# Patient Record
Sex: MALE | Race: WHITE | NOT HISPANIC OR LATINO | URBAN - METROPOLITAN AREA
[De-identification: names, ages, dates, MRNs, and addresses within clinical notes are randomized per-mention and may not be internally consistent; named-entity substitution may affect disease eponyms.]

---

## 2024-10-15 NOTE — PROGRESS NOTES
History Of Present Illness  HPI   The patient is a 69-year-old male who complains of a left inguinal hernia which has been present for over 3 years.  He wears a hernia belt and as long as he wears the belt he has no symptoms.  Without the belt he does have left inguinal bulging and discomfort.  No prior left inguinal hernia repair.  The patient also noticed a small lump in the epigastric region yesterday which is asymptomatic.    Past medical history:  Right inguinal hernia repair in 2011.    Appendectomy for perforated appendix in 2013.    Tobacco: 1/2 pack/day.  I advised the patient to stop smoking.    Past Medical History  He has a past medical history of Personal history of other diseases of the digestive system and Residual hemorrhoidal skin tags (04/21/2020).    Surgical History  He has a past surgical history that includes Other surgical history (02/20/2020) and Other surgical history (07/30/2021).     Allergies  Patient has no known allergies.    Social History  He reports that he has been smoking cigarettes. He has never used smokeless tobacco. He reports that he does not drink alcohol and does not use drugs.    Family History  No family history on file.    Review of Systems  Review of Systems:  Constitutional:  no fever, no chills, no significant weight change  Neurological: No history of CVA or seizure disorder  Eyes: No pain, no recent visual change  ENT:  No recent hearing loss  Neck: No pain  Cardiovascular: No chest pain, no history of cardiac disease such as myocardial infarction or arrhythmia or congestive heart failure  Pulmonary: No shortness of breath, no history of pulmonary disease such as pneumonia or COPD  Breast: No history of breast disease or breast mass  Gastrointestinal:   no abdominal pain, no nausea or vomiting, no constipation or diarrhea or blood in the stool.  No history of ulcers.  No liver, gallbladder or pancreas disease.  No intestinal disorder.  Genitourinary: No hematuria or  "dysuria, no kidney disease  Musculoskeletal:  no arthralgia, no muscle or bone pain  Integumentary:  no rash  Psychiatric:  No anxiety or depression  Endocrine:  no history of diabetes  Hematologic/Lymphatic: No easy bruising or bleeding    Last Recorded Vitals  Blood pressure 121/73, pulse 75, temperature 36.3 °C (97.4 °F), temperature source Temporal, resp. rate 16, height 1.753 m (5' 9\"), weight 67.1 kg (148 lb), SpO2 94%.    Physical Exam  Constitutional: Well-developed, well-nourished, alert and oriented, no acute distress  Skin: Warm and dry, no lesions, no rashes, no jaundice  HEENT: Normocephalic, atraumatic, EOMI, no scleral icterus, eyes have no redness or swelling or discharge, external inspection of ears and nose is normal, mucous membranes moist  Neck: Soft, nontender, no mass or adenopathy  Cardiac: Regular rate and rhythm, no murmur  Chest: Patent airway, clear to auscultation, normal breath sounds with good chest expansion, no wheezes or rales or rhonchi noted, thorax symmetric  Abdomen: Nondistended, positive bowel sounds, soft, nontender, no mass.  Minimal epigastric bulge when straining.  No palpable mass.  Uncertain whether this could be a small epigastric hernia.  Left inguinal hernia, reducible.  No right inguinal or femoral hernias.  Rectal: Not performed  Extremities: No injury, no lower extremity edema or calf tenderness  Lymphatic: No cervical adenopathy  Musculoskeletal: Range of motion intact, no joint swelling, normal strength  Neurological: Alert and oriented x3, intact sensory and motor function, no obvious focal neurologic abnormalities, normal gait  Psychological: Appropriate mood and behavior  Patient declined a chaperone    Relevant Results       Assessment/Plan   Diagnoses and all orders for this visit:  Left inguinal hernia    69-year-old male with a left inguinal hernia which is reducible, but symptomatic.  Recommend left inguinal herniorrhaphy with mesh either open or robotic " assisted laparoscopic.  I discussed the procedure and risks with the patient. The risks include bleeding, infection, mesh infection, recurrence, injury to surrounding structures such as nerves/blood vessels/intestine/bladder, chronic postop pain, cardiac/pulmonary complications.   If the mesh becomes infected it could require excision.  I discussed the alternative of hernia repair without mesh and observation.  The patient had prior perforated appendix, increasing his chance of requiring conversion to an open operation with laparoscopy.  Therefore, the patient agrees to have an open repair of the left inguinal hernia with mesh.  Electronic consent obtained.  I discussed the option of ordering CT abdomen for further evaluation of the small epigastric bulge to rule out a hernia.  The area is asymptomatic and the patient does not wish to have imaging at this time.      Zach Contreras MD

## 2024-10-16 ENCOUNTER — PREP FOR PROCEDURE (OUTPATIENT)
Dept: SURGERY | Facility: CLINIC | Age: 69
End: 2024-10-16

## 2024-10-16 ENCOUNTER — APPOINTMENT (OUTPATIENT)
Dept: SURGERY | Facility: CLINIC | Age: 69
End: 2024-10-16
Payer: COMMERCIAL

## 2024-10-16 VITALS
RESPIRATION RATE: 16 BRPM | BODY MASS INDEX: 21.92 KG/M2 | HEART RATE: 75 BPM | HEIGHT: 69 IN | SYSTOLIC BLOOD PRESSURE: 121 MMHG | DIASTOLIC BLOOD PRESSURE: 73 MMHG | WEIGHT: 148 LBS | TEMPERATURE: 97.4 F | OXYGEN SATURATION: 94 %

## 2024-10-16 DIAGNOSIS — K40.90 LEFT INGUINAL HERNIA: Primary | ICD-10-CM

## 2024-10-16 PROCEDURE — 3008F BODY MASS INDEX DOCD: CPT | Performed by: SURGERY

## 2024-10-16 PROCEDURE — 1126F AMNT PAIN NOTED NONE PRSNT: CPT | Performed by: SURGERY

## 2024-10-16 PROCEDURE — 99203 OFFICE O/P NEW LOW 30 MIN: CPT | Performed by: SURGERY

## 2024-10-16 RX ORDER — CEFAZOLIN SODIUM 2 G/100ML
2 INJECTION, SOLUTION INTRAVENOUS ONCE
OUTPATIENT
Start: 2024-10-16 | End: 2024-10-16

## 2024-10-16 ASSESSMENT — ENCOUNTER SYMPTOMS
LOSS OF SENSATION IN FEET: 0
OCCASIONAL FEELINGS OF UNSTEADINESS: 0
DEPRESSION: 0

## 2024-10-16 ASSESSMENT — PAIN SCALES - GENERAL: PAINLEVEL_OUTOF10: 0-NO PAIN

## 2024-10-16 NOTE — LETTER
October 16, 2024     Loree Lilly MD  [address]  [facility] [address]    Patient: Flavio Espinoza   YOB: 1955   Date of Visit: 10/16/2024       Dear Dr. Loree Lilly MD:    Thank you for referring Flavio Espinoza to me for evaluation. Below are my notes for this consultation.  If you have questions, please do not hesitate to call me. I look forward to following your patient along with you.       Sincerely,     Zach Contreras MD      CC: No Recipients  ______________________________________________________________________________________    History Of Present Illness  HPI   The patient is a 69-year-old male who complains of a left inguinal hernia which has been present for over 3 years.  He wears a hernia belt and as long as he wears the belt he has no symptoms.  Without the belt he does have left inguinal bulging and discomfort.  No prior left inguinal hernia repair.  The patient also noticed a small lump in the epigastric region yesterday which is asymptomatic.    Past medical history:  Right inguinal hernia repair in 2011.    Appendectomy for perforated appendix in 2013.    Tobacco: 1/2 pack/day.  I advised the patient to stop smoking.    Past Medical History  He has a past medical history of Personal history of other diseases of the digestive system and Residual hemorrhoidal skin tags (04/21/2020).    Surgical History  He has a past surgical history that includes Other surgical history (02/20/2020) and Other surgical history (07/30/2021).     Allergies  Patient has no known allergies.    Social History  He reports that he has been smoking cigarettes. He has never used smokeless tobacco. He reports that he does not drink alcohol and does not use drugs.    Family History  No family history on file.    Review of Systems  Review of Systems:  Constitutional:  no fever, no chills, no significant weight change  Neurological: No history of CVA or seizure disorder  Eyes: No pain, no recent visual  "change  ENT:  No recent hearing loss  Neck: No pain  Cardiovascular: No chest pain, no history of cardiac disease such as myocardial infarction or arrhythmia or congestive heart failure  Pulmonary: No shortness of breath, no history of pulmonary disease such as pneumonia or COPD  Breast: No history of breast disease or breast mass  Gastrointestinal:   no abdominal pain, no nausea or vomiting, no constipation or diarrhea or blood in the stool.  No history of ulcers.  No liver, gallbladder or pancreas disease.  No intestinal disorder.  Genitourinary: No hematuria or dysuria, no kidney disease  Musculoskeletal:  no arthralgia, no muscle or bone pain  Integumentary:  no rash  Psychiatric:  No anxiety or depression  Endocrine:  no history of diabetes  Hematologic/Lymphatic: No easy bruising or bleeding    Last Recorded Vitals  Blood pressure 121/73, pulse 75, temperature 36.3 °C (97.4 °F), temperature source Temporal, resp. rate 16, height 1.753 m (5' 9\"), weight 67.1 kg (148 lb), SpO2 94%.    Physical Exam  Constitutional: Well-developed, well-nourished, alert and oriented, no acute distress  Skin: Warm and dry, no lesions, no rashes, no jaundice  HEENT: Normocephalic, atraumatic, EOMI, no scleral icterus, eyes have no redness or swelling or discharge, external inspection of ears and nose is normal, mucous membranes moist  Neck: Soft, nontender, no mass or adenopathy  Cardiac: Regular rate and rhythm, no murmur  Chest: Patent airway, clear to auscultation, normal breath sounds with good chest expansion, no wheezes or rales or rhonchi noted, thorax symmetric  Abdomen: Nondistended, positive bowel sounds, soft, nontender, no mass.  Minimal epigastric bulge when straining.  No palpable mass.  Uncertain whether this could be a small epigastric hernia.  Left inguinal hernia, reducible.  No right inguinal or femoral hernias.  Rectal: Not performed  Extremities: No injury, no lower extremity edema or calf " tenderness  Lymphatic: No cervical adenopathy  Musculoskeletal: Range of motion intact, no joint swelling, normal strength  Neurological: Alert and oriented x3, intact sensory and motor function, no obvious focal neurologic abnormalities, normal gait  Psychological: Appropriate mood and behavior  Patient declined a chaperone    Relevant Results       Assessment/Plan   Diagnoses and all orders for this visit:  Left inguinal hernia    69-year-old male with a left inguinal hernia which is reducible, but symptomatic.  Recommend left inguinal herniorrhaphy with mesh either open or robotic assisted laparoscopic.  I discussed the procedure and risks with the patient. The risks include bleeding, infection, mesh infection, recurrence, injury to surrounding structures such as nerves/blood vessels/intestine/bladder, chronic postop pain, cardiac/pulmonary complications.   If the mesh becomes infected it could require excision.  I discussed the alternative of hernia repair without mesh and observation.  The patient had prior perforated appendix, increasing his chance of requiring conversion to an open operation with laparoscopy.  Therefore, the patient agrees to have an open repair of the left inguinal hernia with mesh.  Electronic consent obtained.  I discussed the option of ordering CT abdomen for further evaluation of the small epigastric bulge to rule out a hernia.  The area is asymptomatic and the patient does not wish to have imaging at this time.      Zach Contreras MD

## 2024-10-17 ENCOUNTER — TELEPHONE (OUTPATIENT)
Dept: SURGERY | Facility: CLINIC | Age: 69
End: 2024-10-17
Payer: COMMERCIAL

## 2024-10-17 NOTE — TELEPHONE ENCOUNTER
Left a detailed message wanting to schedule patient's surgical procedure. Gave my direct phone number for return call.  Upon return call will schedule accordingly.

## 2024-10-30 DIAGNOSIS — Z01.818 PRE-OP TESTING: Primary | ICD-10-CM

## 2024-11-04 ENCOUNTER — LAB (OUTPATIENT)
Dept: LAB | Facility: LAB | Age: 69
End: 2024-11-04
Payer: COMMERCIAL

## 2024-11-04 DIAGNOSIS — Z01.818 PRE-OP TESTING: ICD-10-CM

## 2024-11-04 LAB
ANION GAP SERPL CALC-SCNC: 10 MMOL/L (ref 10–20)
BUN SERPL-MCNC: 13 MG/DL (ref 6–23)
CALCIUM SERPL-MCNC: 9 MG/DL (ref 8.6–10.3)
CHLORIDE SERPL-SCNC: 108 MMOL/L (ref 98–107)
CO2 SERPL-SCNC: 27 MMOL/L (ref 21–32)
CREAT SERPL-MCNC: 1.02 MG/DL (ref 0.5–1.3)
EGFRCR SERPLBLD CKD-EPI 2021: 79 ML/MIN/1.73M*2
ERYTHROCYTE [DISTWIDTH] IN BLOOD BY AUTOMATED COUNT: 14 % (ref 11.5–14.5)
GLUCOSE SERPL-MCNC: 88 MG/DL (ref 74–99)
HCT VFR BLD AUTO: 41.5 % (ref 41–52)
HGB BLD-MCNC: 13.4 G/DL (ref 13.5–17.5)
MCH RBC QN AUTO: 30.1 PG (ref 26–34)
MCHC RBC AUTO-ENTMCNC: 32.3 G/DL (ref 32–36)
MCV RBC AUTO: 93 FL (ref 80–100)
NRBC BLD-RTO: 0 /100 WBCS (ref 0–0)
PLATELET # BLD AUTO: 241 X10*3/UL (ref 150–450)
POTASSIUM SERPL-SCNC: 4.2 MMOL/L (ref 3.5–5.3)
RBC # BLD AUTO: 4.45 X10*6/UL (ref 4.5–5.9)
SODIUM SERPL-SCNC: 141 MMOL/L (ref 136–145)
WBC # BLD AUTO: 6.4 X10*3/UL (ref 4.4–11.3)

## 2024-11-04 PROCEDURE — 85027 COMPLETE CBC AUTOMATED: CPT

## 2024-11-04 PROCEDURE — 80048 BASIC METABOLIC PNL TOTAL CA: CPT

## 2024-11-06 NOTE — PREPROCEDURE INSTRUCTIONS
No outpatient medications have been marked as taking for the 11/7/24 encounter (Hospital Encounter).            NPO Instructions:    Do not eat any food after midnight the night before your surgery/procedure.  You may have 13 ounces of clear liquids until TWO hours before surgery/procedure. This includes water, black tea/coffee, (no milk or cream) apple juice and electrolyte drinks (Gatorade).    Additional Instructions:     Day of Surgery: Arrive at 1:30 PM for 3:10 PM surgery    Enter through main entrance of [facility], located at [address]. Proceed to registration, located in the back right hand corner. You will need your ID and insurance card for registration. Please ensure you have a responsible adult to drive you home.     Take a shower the morning of or night before your procedure. After you shower avoid lotions, powders, deodorants or anything applied to the skin. If you wear contacts or glasses, wear the glasses. If you do not have glasses, please bring a case for your contacts. You may wear hearing aids and dentures, bring a case for them or we will provide one. Make sure you wear something loose and comfortable. Keep in mind your surgery type and wear something that will accommodate incisions or bandages. Please remove all jewelry.     For further questions Russell ABAD can be contacted at [phone] between 7AM-3PM.

## 2024-11-07 ENCOUNTER — ANESTHESIA EVENT (OUTPATIENT)
Dept: OPERATING ROOM | Facility: HOSPITAL | Age: 69
End: 2024-11-07
Payer: COMMERCIAL

## 2024-11-07 ENCOUNTER — HOSPITAL ENCOUNTER (OUTPATIENT)
Facility: HOSPITAL | Age: 69
Setting detail: OUTPATIENT SURGERY
Discharge: HOME | End: 2024-11-07
Attending: SURGERY | Admitting: SURGERY
Payer: COMMERCIAL

## 2024-11-07 ENCOUNTER — ANESTHESIA (OUTPATIENT)
Dept: OPERATING ROOM | Facility: HOSPITAL | Age: 69
End: 2024-11-07
Payer: COMMERCIAL

## 2024-11-07 VITALS
BODY MASS INDEX: 21.91 KG/M2 | HEART RATE: 50 BPM | TEMPERATURE: 96.8 F | RESPIRATION RATE: 16 BRPM | HEIGHT: 69 IN | DIASTOLIC BLOOD PRESSURE: 88 MMHG | WEIGHT: 147.93 LBS | SYSTOLIC BLOOD PRESSURE: 141 MMHG | OXYGEN SATURATION: 99 %

## 2024-11-07 DIAGNOSIS — K40.90 LEFT INGUINAL HERNIA: Primary | ICD-10-CM

## 2024-11-07 PROCEDURE — C1781 MESH (IMPLANTABLE): HCPCS | Performed by: SURGERY

## 2024-11-07 PROCEDURE — 3600000008 HC OR TIME - EACH INCREMENTAL 1 MINUTE - PROCEDURE LEVEL THREE: Performed by: SURGERY

## 2024-11-07 PROCEDURE — 3700000002 HC GENERAL ANESTHESIA TIME - EACH INCREMENTAL 1 MINUTE: Performed by: SURGERY

## 2024-11-07 PROCEDURE — 3600000003 HC OR TIME - INITIAL BASE CHARGE - PROCEDURE LEVEL THREE: Performed by: SURGERY

## 2024-11-07 PROCEDURE — 7100000010 HC PHASE TWO TIME - EACH INCREMENTAL 1 MINUTE: Performed by: SURGERY

## 2024-11-07 PROCEDURE — 2500000004 HC RX 250 GENERAL PHARMACY W/ HCPCS (ALT 636 FOR OP/ED): Performed by: NURSE ANESTHETIST, CERTIFIED REGISTERED

## 2024-11-07 PROCEDURE — 3700000001 HC GENERAL ANESTHESIA TIME - INITIAL BASE CHARGE: Performed by: SURGERY

## 2024-11-07 PROCEDURE — 2780000003 HC OR 278 NO HCPCS: Performed by: SURGERY

## 2024-11-07 PROCEDURE — 7100000009 HC PHASE TWO TIME - INITIAL BASE CHARGE: Performed by: SURGERY

## 2024-11-07 PROCEDURE — 2500000004 HC RX 250 GENERAL PHARMACY W/ HCPCS (ALT 636 FOR OP/ED): Mod: JZ | Performed by: SURGERY

## 2024-11-07 PROCEDURE — 2720000007 HC OR 272 NO HCPCS: Performed by: SURGERY

## 2024-11-07 PROCEDURE — 2500000005 HC RX 250 GENERAL PHARMACY W/O HCPCS: Performed by: NURSE ANESTHETIST, CERTIFIED REGISTERED

## 2024-11-07 PROCEDURE — 93005 ELECTROCARDIOGRAM TRACING: CPT

## 2024-11-07 PROCEDURE — 49505 PRP I/HERN INIT REDUC >5 YR: CPT | Performed by: SURGERY

## 2024-11-07 PROCEDURE — 2500000004 HC RX 250 GENERAL PHARMACY W/ HCPCS (ALT 636 FOR OP/ED): Performed by: SURGERY

## 2024-11-07 DEVICE — BARD SOFT MESH, 4" X 6" (10 CM X 15 CM)
Type: IMPLANTABLE DEVICE | Site: INGUINAL | Status: FUNCTIONAL
Brand: BARD

## 2024-11-07 RX ORDER — PROPOFOL 10 MG/ML
INJECTION, EMULSION INTRAVENOUS AS NEEDED
Status: DISCONTINUED | OUTPATIENT
Start: 2024-11-07 | End: 2024-11-07

## 2024-11-07 RX ORDER — ALBUTEROL SULFATE 0.83 MG/ML
2.5 SOLUTION RESPIRATORY (INHALATION) ONCE AS NEEDED
Status: DISCONTINUED | OUTPATIENT
Start: 2024-11-07 | End: 2024-11-07 | Stop reason: HOSPADM

## 2024-11-07 RX ORDER — PHENYLEPHRINE HCL IN 0.9% NACL 1 MG/10 ML
SYRINGE (ML) INTRAVENOUS AS NEEDED
Status: DISCONTINUED | OUTPATIENT
Start: 2024-11-07 | End: 2024-11-07

## 2024-11-07 RX ORDER — DEXAMETHASONE SODIUM PHOSPHATE 10 MG/ML
6 INJECTION INTRAMUSCULAR; INTRAVENOUS ONCE
Status: DISCONTINUED | OUTPATIENT
Start: 2024-11-07 | End: 2024-11-07 | Stop reason: HOSPADM

## 2024-11-07 RX ORDER — FENTANYL CITRATE 50 UG/ML
INJECTION, SOLUTION INTRAMUSCULAR; INTRAVENOUS AS NEEDED
Status: DISCONTINUED | OUTPATIENT
Start: 2024-11-07 | End: 2024-11-07

## 2024-11-07 RX ORDER — CEFAZOLIN SODIUM 2 G/100ML
2 INJECTION, SOLUTION INTRAVENOUS ONCE
Status: COMPLETED | OUTPATIENT
Start: 2024-11-07 | End: 2024-11-07

## 2024-11-07 RX ORDER — LIDOCAINE HYDROCHLORIDE 10 MG/ML
INJECTION, SOLUTION INFILTRATION; PERINEURAL AS NEEDED
Status: DISCONTINUED | OUTPATIENT
Start: 2024-11-07 | End: 2024-11-07 | Stop reason: HOSPADM

## 2024-11-07 RX ORDER — MIDAZOLAM HYDROCHLORIDE 1 MG/ML
INJECTION, SOLUTION INTRAMUSCULAR; INTRAVENOUS AS NEEDED
Status: DISCONTINUED | OUTPATIENT
Start: 2024-11-07 | End: 2024-11-07

## 2024-11-07 RX ORDER — BUPIVACAINE HYDROCHLORIDE 5 MG/ML
INJECTION, SOLUTION EPIDURAL; INTRACAUDAL AS NEEDED
Status: DISCONTINUED | OUTPATIENT
Start: 2024-11-07 | End: 2024-11-07 | Stop reason: HOSPADM

## 2024-11-07 RX ORDER — GLYCOPYRROLATE 0.2 MG/ML
INJECTION INTRAMUSCULAR; INTRAVENOUS AS NEEDED
Status: DISCONTINUED | OUTPATIENT
Start: 2024-11-07 | End: 2024-11-07

## 2024-11-07 RX ORDER — ACETAMINOPHEN 325 MG/1
650 TABLET ORAL EVERY 4 HOURS PRN
Status: DISCONTINUED | OUTPATIENT
Start: 2024-11-07 | End: 2024-11-07 | Stop reason: HOSPADM

## 2024-11-07 RX ORDER — MEPERIDINE HYDROCHLORIDE 25 MG/ML
12.5 INJECTION INTRAMUSCULAR; INTRAVENOUS; SUBCUTANEOUS EVERY 10 MIN PRN
Status: DISCONTINUED | OUTPATIENT
Start: 2024-11-07 | End: 2024-11-07 | Stop reason: HOSPADM

## 2024-11-07 RX ORDER — LIDOCAINE HYDROCHLORIDE 10 MG/ML
INJECTION, SOLUTION INFILTRATION; PERINEURAL AS NEEDED
Status: DISCONTINUED | OUTPATIENT
Start: 2024-11-07 | End: 2024-11-07

## 2024-11-07 RX ORDER — ONDANSETRON HYDROCHLORIDE 2 MG/ML
INJECTION, SOLUTION INTRAVENOUS AS NEEDED
Status: DISCONTINUED | OUTPATIENT
Start: 2024-11-07 | End: 2024-11-07

## 2024-11-07 RX ORDER — OXYCODONE HYDROCHLORIDE 5 MG/1
5 TABLET ORAL EVERY 6 HOURS PRN
Qty: 8 TABLET | Refills: 0 | Status: SHIPPED | OUTPATIENT
Start: 2024-11-07

## 2024-11-07 RX ORDER — GABAPENTIN 300 MG/1
300 CAPSULE ORAL 3 TIMES DAILY PRN
Qty: 15 CAPSULE | Refills: 0 | Status: SHIPPED | OUTPATIENT
Start: 2024-11-07

## 2024-11-07 SDOH — HEALTH STABILITY: MENTAL HEALTH: CURRENT SMOKER: 1

## 2024-11-07 ASSESSMENT — PAIN SCALES - GENERAL
PAINLEVEL_OUTOF10: 0 - NO PAIN
PAIN_LEVEL: 0
PAINLEVEL_OUTOF10: 0 - NO PAIN

## 2024-11-07 ASSESSMENT — PAIN - FUNCTIONAL ASSESSMENT
PAIN_FUNCTIONAL_ASSESSMENT: 0-10
PAIN_FUNCTIONAL_ASSESSMENT: VAS (VISUAL ANALOG SCALE)

## 2024-11-07 ASSESSMENT — COLUMBIA-SUICIDE SEVERITY RATING SCALE - C-SSRS
1. IN THE PAST MONTH, HAVE YOU WISHED YOU WERE DEAD OR WISHED YOU COULD GO TO SLEEP AND NOT WAKE UP?: NO
2. HAVE YOU ACTUALLY HAD ANY THOUGHTS OF KILLING YOURSELF?: NO
6. HAVE YOU EVER DONE ANYTHING, STARTED TO DO ANYTHING, OR PREPARED TO DO ANYTHING TO END YOUR LIFE?: NO

## 2024-11-07 NOTE — ANESTHESIA PREPROCEDURE EVALUATION
Patient: Flavio Espinoza    Procedure Information       Date/Time: 11/07/24 1510    Procedure: LEFT INGUINAL HERNIA OPEN REPAIR WITH MESH (Left)    Location: [facility] 05 / [facility]    Surgeons: Zach Contreras MD            Relevant Problems   No relevant active problems       Clinical information reviewed:   Tobacco  Allergies  Meds  Problems  Med Hx  Surg Hx   Fam Hx  Soc   Hx        NPO Detail:  NPO/Void Status  Carbohydrate Drink Given Prior to Surgery? : N  Date of Last Liquid: 11/07/24  Time of Last Liquid: 1030  Date of Last Solid: 11/06/24  Time of Last Solid: 2130  Last Intake Type: Solid meal  Time of Last Void: 1430         Physical Exam    Airway  Mallampati: II  TM distance: >3 FB  Neck ROM: full     Cardiovascular - normal exam     Dental - normal exam     Pulmonary - normal exam     Abdominal - normal exam             Anesthesia Plan    History of general anesthesia?: yes  History of complications of general anesthesia?: no    ASA 2     MAC     The patient is a current smoker.  Patient was previously instructed to abstain from smoking on day of procedure.  Patient did not smoke on day of procedure.    intravenous induction   Anesthetic plan and risks discussed with patient.    Plan discussed with CRNA.

## 2024-11-07 NOTE — OP NOTE
LEFT INGUINAL HERNIA OPEN REPAIR WITH MESH (L) Operative Note     Date: 2024  OR Location: [facility]    Name: Flavio Espinoza, : 1955, Age: 69 y.o., MRN: [id number], Sex: male    Diagnosis  Pre-op Diagnosis      * Left inguinal hernia [K40.90] Post-op Diagnosis     * Left inguinal hernia [K40.90]     Procedures  LEFT INGUINAL HERNIA OPEN REPAIR WITH MESH  39[address] - CO RPR 1ST INGUN HRNA AGE 5 YRS/> REDUCIBLE      Surgeons      * Zach Contreras - Primary    Resident/Fellow/Other Assistant:  Surgeons and Role:  * No surgeons found with a matching role *    Staff:   Circulator: Meron De Leonub Person: Liam  Surgical Assistant: Lisa  Surgical Assistant: Isabelle Galo Circulator: Iman  Circulator: Sarah    Anesthesia Staff: Anesthesiologist: Vignesh Grewal MD; Silva Diaz MD  CRNA: KEKE Carroll-MALINDA    Procedure Summary  Anesthesia: Monitor Anesthesia Care  ASA: II  Estimated Blood Loss: 0mL  Intra-op Medications:   Administrations occurring from 1510 to 1710 on 24:   Medication Name Total Dose   lidocaine (Xylocaine) 10 mg/mL (1 %) injection 10 mL   bupivacaine PF 0.5 % (Marcaine) 0.5 % (5 mg/mL) injection 10 mL   dexAMETHasone (Decadron) injection 4 mg/mL 6 mg   fentaNYL (Sublimaze) injection 50 mcg/mL 50 mcg   glycopyrrolate (Robinul) injection 0.2 mg/mL 0.4 mg   ketamine 10 mg/mL IV syringe 30 mg   LR bolus Cannot be calculated   lidocaine (Xylocaine) injection 1 % 50 mg   midazolam (Versed) injection 1 mg/mL 2 mg   ondansetron (Zofran) 2 mg/mL injection 4 mg   phenylephrine 100 mcg/mL syringe 10 mL (prefilled) 250 mcg   propofol (Diprivan) injection 10 mg/mL 628.08 mg   ceFAZolin (Ancef) 2 g in dextrose (iso)  mL 2 g              Anesthesia Record               Intraprocedure I/O Totals          Intake    ceFAZolin (Ancef) 2 g in dextrose (iso)  mL 100.00 mL    Total Intake 100 mL          Specimen: No specimens collected              Drains and/or Catheters: *  None in log *    Tourniquet Times:         Implants:  Implants       Type Name Action Serial No.      Surgical Mesh Sling Implant MESH, SURGICAL, SOFT, POLYPROPYLENE 4 X 6 - [id number] Implanted               Findings: Left direct inguinal hernia    Indications: Flavio Espinoza is an 69 y.o. male who is having surgery for Left inguinal hernia [K40.90].     The patient was seen in the preoperative area. The risks, benefits, complications, treatment options, non-operative alternatives, expected recovery and outcomes were discussed with the patient. The possibilities of reaction to medication, pulmonary aspiration, injury to surrounding structures, bleeding, recurrent infection, the need for additional procedures, failure to diagnose a condition, and creating a complication requiring transfusion or operation were discussed with the patient. The patient concurred with the proposed plan, giving informed consent.  The site of surgery was properly noted/marked if necessary per policy. The patient has been actively warmed in preoperative area. Preoperative antibiotics have been ordered and given within 1 hours of incision. Venous thrombosis prophylaxis have been ordered including bilateral sequential compression devices    Procedure Details: Following informed consent the patient was taken to the operating room and placed in supine position.  A huddle was performed.  The patient was given MAC anesthetic.  Sequential compression devices were in place and functioning.  The patient received Ancef IV in the operating room.  The abdomen was prepped and draped in sterile fashion.  A timeout was performed.  Following infiltration of local anesthetic a [left] inguinal incision was made along the skin lines and extended through the subcutaneous tissue.  The external oblique aponeurosis was incised along the lines of its fibers to the external inguinal ring medially.  The ilioinguinal nerve was identified and carefully preserved.  The  spermatic cord was freed from surrounding tissue and a Penrose drain placed around it for retraction.  The patient was found to have a direct hernia which was freed from the spermatic cord, reduced and imbricated by reapproximating the transversalis fascia with a running 2-0 Prolene.  The anterolateral portion of the spermatic cord was dissected.  No indirect hernia was visualized.  A 10 x 15 cm diameter polypropylene mesh was cut to the appropriate size and secured circumferentially with 2-0 Prolene suture.  A running suture was used to approximate the mesh to Poupart's ligament beginning medial to the pubic tubercle and extending lateral to the internal inguinal ring.  Laterally the mesh was divided and the 2 legs of the mesh placed around the spermatic cord at the internal inguinal ring.  Medially and superiorly the mesh was secured to the internal oblique and transversus abdominis with interrupted sutures.  Laterally the 2 legs of the mesh was secured to each other and to Poupart's ligament with interrupted sutures.  The external oblique aponeurosis was reapproximated with a running 3-0 Vicryl suture.  Half percent plain Marcaine was infiltrated.  The subcutaneous tissue was closed with interrupted 3-0 chromic sutures.  Skin was closed with a running 4-0 Vicryl subcuticular stitch.  Dressings were placed and the patient was taken to the recovery room in satisfactory condition having tolerated the procedure well.  Counts were correct.  Complications:  None; patient tolerated the procedure well.    Disposition: PACU - hemodynamically stable.  Condition: stable         Task Performed by RNFA or Surgical Assistant:  Skin closure          Zach Contreras  Phone Number: [phone]

## 2024-11-07 NOTE — ANESTHESIA POSTPROCEDURE EVALUATION
Patient: Flavio Espinoza    Procedure Summary       Date: 11/07/24 Room / Location: [facility] / [facility]    Anesthesia Start: 1538 Anesthesia Stop:     Procedure: LEFT INGUINAL HERNIA OPEN REPAIR WITH MESH (Left) Diagnosis:       Left inguinal hernia      (Left inguinal hernia [K40.90])    Surgeons: Zach Contreras MD Responsible Provider: Vignesh Grewal MD    Anesthesia Type: MAC ASA Status: 2            Anesthesia Type: MAC    Vitals Value Taken Time   /78 11/07/24 1706   Temp 36.0 11/07/24 1706   Pulse 61 11/07/24 1706   Resp 18 11/07/24 1706   SpO2 100 11/07/24 1706       Anesthesia Post Evaluation    Patient location during evaluation: PACU  Patient participation: complete - patient participated  Level of consciousness: sleepy but conscious  Pain score: 0  Pain management: adequate  Airway patency: patent  Cardiovascular status: acceptable and hemodynamically stable  Respiratory status: acceptable, face mask, nonlabored ventilation and spontaneous ventilation  Hydration status: acceptable  Postoperative Nausea and Vomiting: none        There were no known notable events for this encounter.

## 2024-11-08 LAB
ATRIAL RATE: 49 BPM
P AXIS: 63 DEGREES
P OFFSET: 198 MS
P ONSET: 139 MS
PR INTERVAL: 178 MS
Q ONSET: 228 MS
QRS COUNT: 8 BEATS
QRS DURATION: 76 MS
QT INTERVAL: 444 MS
QTC CALCULATION(BAZETT): 401 MS
QTC FREDERICIA: 415 MS
R AXIS: -29 DEGREES
T AXIS: 36 DEGREES
T OFFSET: 450 MS
VENTRICULAR RATE: 49 BPM

## 2024-12-10 PROBLEM — Z09 POSTOP CHECK: Status: ACTIVE | Noted: 2024-12-10

## 2024-12-10 NOTE — PROGRESS NOTES
"History Of Present Illness  Flavio Espinoza is a 69 y.o. male status post open repair of left direct inguinal hernia with mesh on November 7, 2024.  No complaints.  He returned to normal daily activity in 5 days.  He took no oxycodone tablets postop.     Last Recorded Vitals  Blood pressure 119/71, pulse 66, temperature 35.9 °C (96.6 °F), temperature source Temporal, resp. rate 16, height 1.753 m (5' 9\"), weight 70.3 kg (155 lb), SpO2 97%.    Physical Exam  General: Well-developed, well-nourished, no acute distress, alert and oriented  Wound: Intact, no erythema or signs of infection         Assessment/Plan   Diagnoses and all orders for this visit:  Postop check    Recovering well.  Follow-up as needed.      Zach Contreras MD  "

## 2024-12-11 ENCOUNTER — APPOINTMENT (OUTPATIENT)
Dept: SURGERY | Facility: CLINIC | Age: 69
End: 2024-12-11
Payer: COMMERCIAL

## 2024-12-11 VITALS
BODY MASS INDEX: 22.96 KG/M2 | RESPIRATION RATE: 16 BRPM | OXYGEN SATURATION: 97 % | HEART RATE: 66 BPM | HEIGHT: 69 IN | SYSTOLIC BLOOD PRESSURE: 119 MMHG | DIASTOLIC BLOOD PRESSURE: 71 MMHG | TEMPERATURE: 96.6 F | WEIGHT: 155 LBS

## 2024-12-11 DIAGNOSIS — Z09 POSTOP CHECK: Primary | ICD-10-CM

## 2024-12-11 PROCEDURE — 1159F MED LIST DOCD IN RCRD: CPT | Performed by: SURGERY

## 2024-12-11 PROCEDURE — 99024 POSTOP FOLLOW-UP VISIT: CPT | Performed by: SURGERY

## 2024-12-11 PROCEDURE — 1125F AMNT PAIN NOTED PAIN PRSNT: CPT | Performed by: SURGERY

## 2024-12-11 PROCEDURE — 3008F BODY MASS INDEX DOCD: CPT | Performed by: SURGERY

## 2024-12-11 ASSESSMENT — PAIN SCALES - GENERAL: PAINLEVEL_OUTOF10: 4

## (undated) DEVICE — DRAPE, INCISE, ANTIMICROBIAL, IOBAN 2, LARGE, 17 X 23 IN, DISPOSABLE, STERILE

## (undated) DEVICE — Device

## (undated) DEVICE — SLEEVE, VASO PRESS, CALF GARMENT, MEDIUM, GREEN

## (undated) DEVICE — DRESSING, TRANSPARENT, TEGADERM, 4 X 4-3/4 IN, NO LABEL

## (undated) DEVICE — DRESSING, TELFA, 3X4